# Patient Record
Sex: MALE | Race: WHITE | NOT HISPANIC OR LATINO | Employment: FULL TIME | URBAN - METROPOLITAN AREA
[De-identification: names, ages, dates, MRNs, and addresses within clinical notes are randomized per-mention and may not be internally consistent; named-entity substitution may affect disease eponyms.]

---

## 2017-05-15 ENCOUNTER — HOSPITAL ENCOUNTER (OUTPATIENT)
Dept: CT IMAGING | Facility: HOSPITAL | Age: 67
Discharge: HOME | End: 2017-05-15
Attending: INTERNAL MEDICINE | Admitting: INTERNAL MEDICINE

## 2017-05-15 ENCOUNTER — TRANSCRIBE ORDERS (OUTPATIENT)
Dept: ADMINISTRATIVE | Facility: HOSPITAL | Age: 67
End: 2017-05-15

## 2017-05-15 DIAGNOSIS — Z13.9 SCREENING: ICD-10-CM

## 2017-05-15 DIAGNOSIS — Z13.9 SCREENING: Primary | ICD-10-CM

## 2017-05-15 PROCEDURE — 75571 CT HRT W/O DYE W/CA TEST: CPT

## 2017-05-31 ENCOUNTER — TRANSCRIBE ORDERS (OUTPATIENT)
Dept: ADMINISTRATIVE | Facility: HOSPITAL | Age: 67
End: 2017-05-31

## 2017-05-31 DIAGNOSIS — Z13.9 SCREENING: Primary | ICD-10-CM

## 2017-06-22 ENCOUNTER — HOSPITAL ENCOUNTER (OUTPATIENT)
Dept: CARDIOLOGY | Facility: HOSPITAL | Age: 67
Discharge: HOME | End: 2017-06-22
Attending: INTERNAL MEDICINE | Admitting: INTERNAL MEDICINE

## 2017-06-22 VITALS
BODY MASS INDEX: 31.25 KG/M2 | DIASTOLIC BLOOD PRESSURE: 94 MMHG | WEIGHT: 211 LBS | HEART RATE: 82 BPM | HEIGHT: 69 IN | SYSTOLIC BLOOD PRESSURE: 149 MMHG

## 2017-06-22 DIAGNOSIS — Z13.9 SCREENING: ICD-10-CM

## 2017-06-22 LAB
BH CV ECHO MEAS - DIST AO DIAM: 1.48 CM
BH CV VAS BP LEFT ARM: NORMAL MMHG
BH CV VAS BP RIGHT ARM: NORMAL MMHG
BH CV XLRA MEAS - MID AO DIAM: 1.67 CM
BH CV XLRA MEAS - PAD LEFT ABI DP: 1.07
BH CV XLRA MEAS - PAD LEFT ABI PT: 1.12
BH CV XLRA MEAS - PAD LEFT ARM: 149 MMHG
BH CV XLRA MEAS - PAD LEFT LEG DP: 160 MMHG
BH CV XLRA MEAS - PAD LEFT LEG PT: 168 MMHG
BH CV XLRA MEAS - PAD RIGHT ABI DP: 1.07
BH CV XLRA MEAS - PAD RIGHT ABI PT: 1.1
BH CV XLRA MEAS - PAD RIGHT ARM: 147 MMHG
BH CV XLRA MEAS - PAD RIGHT LEG DP: 160 MMHG
BH CV XLRA MEAS - PAD RIGHT LEG PT: 164 MMHG
BH CV XLRA MEAS - PROX AO DIAM: 1.9 CM
BH CV XLRA MEAS LEFT ICA/CCA RATIO: 0.6
BH CV XLRA MEAS LEFT MID CCA PSV: NORMAL CM/SEC
BH CV XLRA MEAS LEFT MID ICA PSV: NORMAL CM/SEC
BH CV XLRA MEAS LEFT PROX ECA PSV: NORMAL CM/SEC
BH CV XLRA MEAS RIGHT ICA/CCA RATIO: 0.67
BH CV XLRA MEAS RIGHT MID CCA PSV: NORMAL CM/SEC
BH CV XLRA MEAS RIGHT MID ICA PSV: NORMAL CM/SEC
BH CV XLRA MEAS RIGHT PROX ECA PSV: NORMAL CM/SEC

## 2017-06-22 PROCEDURE — 93799 UNLISTED CV SVC/PROCEDURE: CPT

## 2018-05-07 ENCOUNTER — TRANSCRIBE ORDERS (OUTPATIENT)
Dept: ADMINISTRATIVE | Facility: HOSPITAL | Age: 68
End: 2018-05-07

## 2018-05-07 DIAGNOSIS — M54.2 NECK PAIN: Primary | ICD-10-CM

## 2018-05-15 ENCOUNTER — HOSPITAL ENCOUNTER (OUTPATIENT)
Dept: MRI IMAGING | Facility: HOSPITAL | Age: 68
Discharge: HOME | End: 2018-05-15
Attending: INTERNAL MEDICINE | Admitting: INTERNAL MEDICINE

## 2018-05-15 DIAGNOSIS — M54.2 NECK PAIN: ICD-10-CM

## 2018-05-15 PROCEDURE — 72141 MRI NECK SPINE W/O DYE: CPT

## 2018-05-29 RX ORDER — ROSUVASTATIN CALCIUM 5 MG/1
5 TABLET, COATED ORAL
COMMUNITY

## 2018-05-29 RX ORDER — ASPIRIN 81 MG/1
81 TABLET, CHEWABLE ORAL DAILY
COMMUNITY

## 2018-05-29 RX ORDER — MONTELUKAST SODIUM 10 MG/1
10 TABLET ORAL
COMMUNITY

## 2019-05-15 ENCOUNTER — TRANSCRIBE ORDERS (OUTPATIENT)
Dept: ADMINISTRATIVE | Facility: HOSPITAL | Age: 69
End: 2019-05-15

## 2019-05-15 DIAGNOSIS — Z13.9 VISIT FOR SCREENING: Primary | ICD-10-CM

## 2019-05-23 ENCOUNTER — HOSPITAL ENCOUNTER (OUTPATIENT)
Dept: CT IMAGING | Facility: HOSPITAL | Age: 69
Discharge: HOME | End: 2019-05-23
Admitting: INTERNAL MEDICINE

## 2019-05-23 DIAGNOSIS — Z13.9 VISIT FOR SCREENING: ICD-10-CM

## 2019-05-23 PROCEDURE — 75571 CT HRT W/O DYE W/CA TEST: CPT

## 2021-01-09 ENCOUNTER — IMMUNIZATION (OUTPATIENT)
Dept: VACCINE CLINIC | Facility: HOSPITAL | Age: 71
End: 2021-01-09

## 2021-01-09 PROCEDURE — 91300 HC SARSCOV02 VAC 30MCG/0.3ML IM: CPT | Performed by: INTERNAL MEDICINE

## 2021-01-09 PROCEDURE — 0001A: CPT | Performed by: INTERNAL MEDICINE

## 2021-01-21 ENCOUNTER — TRANSCRIBE ORDERS (OUTPATIENT)
Dept: ADMINISTRATIVE | Facility: HOSPITAL | Age: 71
End: 2021-01-21

## 2021-01-21 DIAGNOSIS — R07.9 CHEST PAIN, UNSPECIFIED TYPE: Primary | ICD-10-CM

## 2021-01-21 DIAGNOSIS — I72.6 VERTEBRAL ARTERY ANEURYSM (HCC): Primary | ICD-10-CM

## 2021-01-27 ENCOUNTER — APPOINTMENT (OUTPATIENT)
Dept: CT IMAGING | Facility: HOSPITAL | Age: 71
End: 2021-01-27

## 2021-01-27 ENCOUNTER — HOSPITAL ENCOUNTER (OUTPATIENT)
Dept: CT IMAGING | Facility: HOSPITAL | Age: 71
Discharge: HOME | End: 2021-01-27
Admitting: INTERNAL MEDICINE

## 2021-01-27 DIAGNOSIS — R07.9 CHEST PAIN, UNSPECIFIED TYPE: ICD-10-CM

## 2021-01-27 DIAGNOSIS — I72.6 VERTEBRAL ARTERY ANEURYSM (HCC): ICD-10-CM

## 2021-01-27 PROCEDURE — 74176 CT ABD & PELVIS W/O CONTRAST: CPT

## 2021-01-27 PROCEDURE — 71250 CT THORAX DX C-: CPT

## 2021-01-29 ENCOUNTER — APPOINTMENT (OUTPATIENT)
Dept: VACCINE CLINIC | Facility: HOSPITAL | Age: 71
End: 2021-01-29

## 2021-01-30 ENCOUNTER — IMMUNIZATION (OUTPATIENT)
Dept: VACCINE CLINIC | Facility: HOSPITAL | Age: 71
End: 2021-01-30

## 2021-01-30 PROCEDURE — 91300 HC SARSCOV02 VAC 30MCG/0.3ML IM: CPT | Performed by: INTERNAL MEDICINE

## 2021-01-30 PROCEDURE — 0002A: CPT | Performed by: INTERNAL MEDICINE

## 2021-02-09 ENCOUNTER — APPOINTMENT (OUTPATIENT)
Dept: CT IMAGING | Facility: HOSPITAL | Age: 71
End: 2021-02-09

## 2021-09-21 ENCOUNTER — APPOINTMENT (OUTPATIENT)
Dept: VACCINE CLINIC | Facility: HOSPITAL | Age: 71
End: 2021-09-21

## 2023-02-07 ENCOUNTER — TRANSCRIBE ORDERS (OUTPATIENT)
Dept: ADMINISTRATIVE | Facility: HOSPITAL | Age: 73
End: 2023-02-07
Payer: MEDICARE

## 2023-02-07 DIAGNOSIS — E04.1 THYROID NODULE: Primary | ICD-10-CM

## 2023-02-07 DIAGNOSIS — I70.0 ATHEROSCLEROSIS OF AORTA: ICD-10-CM

## 2023-02-07 DIAGNOSIS — I72.8 ANEURYSM OF SPLENIC ARTERY: ICD-10-CM

## 2023-03-07 ENCOUNTER — HOSPITAL ENCOUNTER (OUTPATIENT)
Dept: CT IMAGING | Facility: HOSPITAL | Age: 73
Discharge: HOME | End: 2023-03-07
Payer: MEDICARE

## 2023-03-07 ENCOUNTER — HOSPITAL ENCOUNTER (OUTPATIENT)
Dept: ULTRASOUND IMAGING | Facility: HOSPITAL | Age: 73
Discharge: HOME | End: 2023-03-07
Payer: MEDICARE

## 2023-03-07 DIAGNOSIS — E04.1 THYROID NODULE: ICD-10-CM

## 2023-03-07 DIAGNOSIS — I72.8 ANEURYSM OF SPLENIC ARTERY: ICD-10-CM

## 2023-03-07 DIAGNOSIS — I70.0 ATHEROSCLEROSIS OF AORTA: ICD-10-CM

## 2023-03-07 PROCEDURE — 76536 US EXAM OF HEAD AND NECK: CPT

## 2023-03-07 PROCEDURE — 74176 CT ABD & PELVIS W/O CONTRAST: CPT

## 2023-03-07 PROCEDURE — 71250 CT THORAX DX C-: CPT

## 2023-03-08 ENCOUNTER — TRANSCRIBE ORDERS (OUTPATIENT)
Dept: ADMINISTRATIVE | Facility: HOSPITAL | Age: 73
End: 2023-03-08
Payer: MEDICARE

## 2023-03-08 DIAGNOSIS — K22.2 STRICTURE OF ESOPHAGUS: Primary | ICD-10-CM

## 2023-09-07 ENCOUNTER — TRANSCRIBE ORDERS (OUTPATIENT)
Dept: ADMINISTRATIVE | Facility: HOSPITAL | Age: 73
End: 2023-09-07
Payer: MEDICARE

## 2023-09-07 DIAGNOSIS — Z13.6 ENCOUNTER FOR SCREENING FOR VASCULAR DISEASE: Primary | ICD-10-CM

## 2023-10-30 ENCOUNTER — TELEPHONE (OUTPATIENT)
Dept: SURGERY | Facility: CLINIC | Age: 73
End: 2023-10-30
Payer: MEDICARE

## 2023-10-30 ENCOUNTER — OFFICE VISIT (OUTPATIENT)
Dept: SURGERY | Facility: CLINIC | Age: 73
End: 2023-10-30
Payer: MEDICARE

## 2023-10-30 VITALS
HEART RATE: 85 BPM | HEIGHT: 70 IN | OXYGEN SATURATION: 99 % | SYSTOLIC BLOOD PRESSURE: 168 MMHG | BODY MASS INDEX: 31.54 KG/M2 | DIASTOLIC BLOOD PRESSURE: 83 MMHG | WEIGHT: 220.3 LBS

## 2023-10-30 DIAGNOSIS — K64.4 EXTERNAL HEMORRHOIDS: Primary | ICD-10-CM

## 2023-10-30 PROBLEM — N52.9 ERECTILE DYSFUNCTION: Status: ACTIVE | Noted: 2023-09-05

## 2023-10-30 PROBLEM — R93.89 ABNORMAL COMPUTERIZED AXIAL TOMOGRAPHY OF CHEST: Status: ACTIVE | Noted: 2023-09-05

## 2023-10-30 PROBLEM — L73.9 CHRONIC FOLLICULITIS: Status: ACTIVE | Noted: 2020-07-08

## 2023-10-30 PROBLEM — E66.3 OVERWEIGHT: Status: ACTIVE | Noted: 2019-11-20

## 2023-10-30 PROBLEM — Z98.42 CATARACT EXTRACTION STATUS OF LEFT EYE: Status: ACTIVE | Noted: 2021-07-08

## 2023-10-30 PROBLEM — L91.8 SKIN TAGS, MULTIPLE ACQUIRED: Status: ACTIVE | Noted: 2023-02-15

## 2023-10-30 PROBLEM — L21.9 SEBORRHEIC DERMATITIS: Status: ACTIVE | Noted: 2022-08-18

## 2023-10-30 PROBLEM — M84.361A STRESS FRACTURE OF RIGHT TIBIA: Status: ACTIVE | Noted: 2019-07-10

## 2023-10-30 PROBLEM — J98.11 CHRONIC ATELECTASIS: Status: ACTIVE | Noted: 2023-03-09

## 2023-10-30 PROBLEM — K22.9 DISORDER OF ESOPHAGUS: Status: ACTIVE | Noted: 2023-03-09

## 2023-10-30 PROBLEM — L08.9 INFECTED SEBACEOUS CYST: Status: ACTIVE | Noted: 2023-08-02

## 2023-10-30 PROBLEM — I72.8 ANEURYSM OF SPLENIC ARTERY: Status: ACTIVE | Noted: 2019-07-10

## 2023-10-30 PROBLEM — Z98.41 CATARACT EXTRACTION STATUS OF RIGHT EYE: Status: ACTIVE | Noted: 2021-07-08

## 2023-10-30 PROBLEM — J36 PERITONSILLAR ABSCESS: Status: ACTIVE | Noted: 2020-07-08

## 2023-10-30 PROBLEM — Z80.0 FAMILY HISTORY OF MALIGNANT NEOPLASM OF COLON: Status: ACTIVE | Noted: 2017-05-25

## 2023-10-30 PROBLEM — H35.369 MACULAR DRUSEN: Status: ACTIVE | Noted: 2021-07-08

## 2023-10-30 PROBLEM — K40.90 RIGHT INGUINAL HERNIA: Status: ACTIVE | Noted: 2018-06-04

## 2023-10-30 PROBLEM — E83.52 HYPERCALCEMIA: Status: ACTIVE | Noted: 2019-11-20

## 2023-10-30 PROBLEM — H61.20 IMPACTED CERUMEN: Status: ACTIVE | Noted: 2019-07-10

## 2023-10-30 PROBLEM — L72.3 INFECTED SEBACEOUS CYST: Status: ACTIVE | Noted: 2023-08-02

## 2023-10-30 PROBLEM — R73.01 IMPAIRED FASTING GLUCOSE: Status: ACTIVE | Noted: 2023-02-07

## 2023-10-30 PROBLEM — I10 ESSENTIAL HYPERTENSION: Status: ACTIVE | Noted: 2017-01-30

## 2023-10-30 PROBLEM — E04.2 MULTINODULAR GOITER: Status: ACTIVE | Noted: 2023-09-05

## 2023-10-30 PROBLEM — G54.2 CERVICAL SYNDROME: Status: ACTIVE | Noted: 2018-02-19

## 2023-10-30 PROBLEM — H43.819 VITREOUS DETACHMENT: Status: ACTIVE | Noted: 2019-07-10

## 2023-10-30 PROBLEM — G72.2 TOXIC MYOPATHY: Status: ACTIVE | Noted: 2017-01-30

## 2023-10-30 PROBLEM — J45.909 ALLERGIC ASTHMA: Status: ACTIVE | Noted: 2019-04-22

## 2023-10-30 PROBLEM — H33.311 RETINAL TEAR OF RIGHT EYE: Status: ACTIVE | Noted: 2021-07-08

## 2023-10-30 PROBLEM — Q61.02 MULTIPLE RENAL CYSTS: Status: ACTIVE | Noted: 2023-03-09

## 2023-10-30 PROBLEM — J20.9 ACUTE BRONCHITIS: Status: ACTIVE | Noted: 2023-04-17

## 2023-10-30 PROCEDURE — 3077F SYST BP >= 140 MM HG: CPT | Performed by: PHYSICIAN ASSISTANT

## 2023-10-30 PROCEDURE — 3079F DIAST BP 80-89 MM HG: CPT | Performed by: PHYSICIAN ASSISTANT

## 2023-10-30 PROCEDURE — 99204 OFFICE O/P NEW MOD 45 MIN: CPT | Performed by: PHYSICIAN ASSISTANT

## 2023-10-30 PROCEDURE — 46600 DIAGNOSTIC ANOSCOPY SPX: CPT | Performed by: PHYSICIAN ASSISTANT

## 2023-10-30 PROCEDURE — 1159F MED LIST DOCD IN RCRD: CPT | Performed by: PHYSICIAN ASSISTANT

## 2023-10-30 PROCEDURE — 1160F RVW MEDS BY RX/DR IN RCRD: CPT | Performed by: PHYSICIAN ASSISTANT

## 2023-10-30 RX ORDER — MULTIPLE VITAMINS W/ MINERALS TAB 9MG-400MCG
1 TAB ORAL DAILY
COMMUNITY
Start: 2016-05-24

## 2023-10-30 RX ORDER — SILDENAFIL 100 MG/1
100 TABLET, FILM COATED ORAL AS NEEDED
COMMUNITY
Start: 2023-10-26

## 2023-10-30 RX ORDER — CETIRIZINE HYDROCHLORIDE 10 MG/1
CAPSULE, LIQUID FILLED ORAL EVERY 24 HOURS
COMMUNITY

## 2023-10-30 RX ORDER — MINOCYCLINE HYDROCHLORIDE 100 MG/1
100 CAPSULE ORAL 2 TIMES DAILY
COMMUNITY
End: 2023-10-30

## 2023-10-30 RX ORDER — PROPRANOLOL HYDROCHLORIDE 20 MG/1
20 TABLET ORAL 2 TIMES DAILY
COMMUNITY
End: 2023-10-30

## 2023-10-30 RX ORDER — ASCORBIC ACID 1000 MG
TABLET ORAL
COMMUNITY
Start: 2014-04-08 | End: 2023-10-30

## 2023-10-30 RX ORDER — EVOLOCUMAB 140 MG/ML
1 INJECTION, SOLUTION SUBCUTANEOUS
COMMUNITY
End: 2023-10-30

## 2023-10-30 RX ORDER — ERGOCALCIFEROL 1.25 MG/1
50000 CAPSULE ORAL
COMMUNITY

## 2023-10-30 RX ORDER — ROSUVASTATIN CALCIUM 10 MG/1
10 TABLET, COATED ORAL DAILY
COMMUNITY

## 2023-10-30 RX ORDER — METOPROLOL SUCCINATE 25 MG/1
25 TABLET, EXTENDED RELEASE ORAL DAILY
COMMUNITY

## 2023-10-30 RX ORDER — HYDROCORTISONE 25 MG/G
CREAM TOPICAL
Qty: 30 G | Refills: 1 | Status: SHIPPED | OUTPATIENT
Start: 2023-10-30 | End: 2023-10-30 | Stop reason: SDUPTHER

## 2023-10-30 RX ORDER — TRIAMCINOLONE ACETONIDE 0.25 MG/G
1 CREAM TOPICAL 2 TIMES DAILY
COMMUNITY
End: 2023-10-30

## 2023-10-30 RX ORDER — HYDROCORTISONE 25 MG/G
CREAM TOPICAL
Qty: 30 G | Refills: 1 | Status: SHIPPED | OUTPATIENT
Start: 2023-10-30

## 2023-10-30 RX ORDER — EZETIMIBE 10 MG/1
10 TABLET ORAL DAILY
COMMUNITY
End: 2023-10-30

## 2023-10-30 NOTE — TELEPHONE ENCOUNTER
Pt was calling because he did not see med sent to pharmacy. Rx was sent to [facility] and should have been sent to [facility]. Pharmacy has been updated to correct one.

## 2023-10-30 NOTE — PROGRESS NOTES
Shankar Vides is a 73 y.o. male who is seen as a consult at the request of Self Referring for Hemorrhoids.      HPI:    Pt presents today for evaluation of intermittent perianal burning sensation.  Started a few years ago.  Typically starts before/during defecation.   Does not use any creams or suppositories.     Has approximately 2-3 BMs daily.   Stool was loose ([place]: 6), but since starting Florastor, has become more formed ([place]: 2).   No constipation.   Not taking any fiber or laxatives.   No straining.   No RB or mucus discharge.    Pt states he had a thrombosed external hemorrhoid approximately 20 years ago.   Denies undergoing thrombectomy, and states area healed on it's own with time, but left a skin tag in the area.   Denies any anorectal surgical Hx.     Experienced occasional fecal leakage since childhood.   Recently started using bidet, and has not had this issue since.     Taking ASA 81 mg daily.   Denies taking any anticoagulation.     Last colonoscopy approximately 9 years ago.   Tests with Cologuard every 3 years since.   Next colonoscopy due next year.  Reports diverticulosis, but denies any personal Hx of colon polyps.     Paternal uncle with Hx of colon cancer.   No FHx of colon polyps or colon cancer in any first degree relatives.   Father with Hx of UC Dx in his 60's.     Past Medical History:   Diagnosis Date    Hyperlipidemia     PVC (premature ventricular contraction)        Past Surgical History:   Procedure Laterality Date    EYE SURGERY Bilateral     cataract    INGUINAL HERNIA REPAIR Right        Social History:   reports that he has never smoked. He has never been exposed to tobacco smoke. He has never used smokeless tobacco. He reports that he does not drink alcohol and does not use drugs.      Marriage status:     Family History   Problem Relation Age of Onset    Heart disease Mother     Cancer Father          Current Outpatient Medications:     aspirin 81 MG chewable  tablet, Chew 1 tablet Daily., Disp: , Rfl:     Cetirizine HCl (ZyrTEC Allergy) 10 MG capsule, Daily., Disp: , Rfl:     Magnesium 100 MG capsule, magnesium, Disp: , Rfl:     metoprolol succinate XL (TOPROL-XL) 25 MG 24 hr tablet, Take 1 tablet by mouth Daily., Disp: , Rfl:     multivitamin with minerals (MULTIVITAMIN ADULTS PO), Take 1 tablet by mouth Daily., Disp: , Rfl:     rosuvastatin (CRESTOR) 10 MG tablet, Take 1 tablet by mouth Daily., Disp: , Rfl:     rosuvastatin (CRESTOR) 5 MG tablet, Take 1 tablet by mouth., Disp: , Rfl:     sildenafil (VIAGRA) 100 MG tablet, Take 1 tablet by mouth As Needed., Disp: , Rfl:     vitamin D (ERGOCALCIFEROL) 1.25 MG (04328 UT) capsule capsule, Take 1 capsule by mouth Every 7 (Seven) Days., Disp: , Rfl:     Hydrocortisone, Perianal, (Anusol-HC) 2.5 % rectal cream, Apply rectally 3 times daily.  Include applicator., Disp: 30 g, Rfl: 1    Allergy  Patient has no known allergies.    Review of Systems   Constitutional: Negative for decreased appetite and weight gain.   HENT:  Negative for congestion, hearing loss and hoarse voice.    Eyes:  Negative for blurred vision, discharge and visual disturbance.   Cardiovascular:  Negative for chest pain, cyanosis and leg swelling.   Respiratory:  Negative for cough, shortness of breath, sleep disturbances due to breathing and snoring.    Endocrine: Negative for cold intolerance and heat intolerance.   Hematologic/Lymphatic: Does not bruise/bleed easily.   Skin:  Negative for itching, poor wound healing and skin cancer.   Musculoskeletal:  Positive for neck pain. Negative for arthritis, back pain, joint pain and joint swelling.   Gastrointestinal:  Negative for abdominal pain, change in bowel habit, bowel incontinence and constipation.   Genitourinary:  Negative for bladder incontinence, dysuria and hematuria.   Neurological:  Negative for brief paralysis, excessive daytime sleepiness, dizziness, focal weakness, headaches, light-headedness  and weakness.   Psychiatric/Behavioral:  Negative for altered mental status and hallucinations. The patient does not have insomnia.    Allergic/Immunologic: Negative for HIV exposure and persistent infections.   All other systems reviewed and are negative.      Vitals:    10/30/23 1014   BP: 168/83   Pulse: 85   SpO2: 99%     Body mass index is 31.61 kg/m².    Physical Exam  Exam conducted with a chaperone present.   Constitutional:       General: He is not in acute distress.     Appearance: He is well-developed.   HENT:      Head: Normocephalic and atraumatic.      Nose: Nose normal.   Eyes:      Conjunctiva/sclera: Conjunctivae normal.      Pupils: Pupils are equal, round, and reactive to light.   Neck:      Trachea: No tracheal deviation.   Pulmonary:      Effort: Pulmonary effort is normal. No respiratory distress.      Breath sounds: Normal breath sounds.   Abdominal:      General: Bowel sounds are normal. There is no distension.      Palpations: Abdomen is soft.   Genitourinary:     Comments: Perianal exam: Anal skin tag anteriorly. Small divot anteriorly with mild erythema. Mild enlargement of the left lateral and right posterior hemorrhoids. Multiple small sebaceous cyst circumferentially.   MARTA- Good tone, no masses  Anoscopy performed: Internal hemorrhoids WNL.   Musculoskeletal:         General: No deformity. Normal range of motion.      Cervical back: Normal range of motion.   Skin:     General: Skin is warm and dry.   Neurological:      Mental Status: He is alert and oriented to person, place, and time.      Cranial Nerves: No cranial nerve deficit.      Coordination: Coordination normal.      Gait: Gait normal.   Psychiatric:         Behavior: Behavior normal.         Judgment: Judgment normal.       Review of Medical Record:  I reviewed medical records as detailed in HPI.     Assessment:  1. External hemorrhoids    - New    Plan:  - Discussed physical exam findings with Pt.    - It is possible that  stool is pocketing within the small divot anteriorly causing skin irritation/burning. Pt using a bidet with enhanced cleaning of the perianal skin. Also discussed foods that could cause perianal skin irritation such as caffeine, spicy foods, dairy, tomato based products, etc. Can use a barrier cream PRN to prevent/reduced irritation.   - Rx for Anusol-HC 2.5% rectal cream provided for treatment of the hemorrhoids. Apply Internally and Externally TID x7-10 Days. Cautioned against chronic use.   - Follow up in 8 weeks for reevaluation or sooner for any new or worsening symptoms.

## 2023-11-03 ENCOUNTER — PATIENT ROUNDING (BHMG ONLY) (OUTPATIENT)
Dept: SURGERY | Facility: CLINIC | Age: 73
End: 2023-11-03
Payer: MEDICARE

## 2023-11-03 NOTE — PROGRESS NOTES
November 3, 2023    Hello, may I speak with Shankar Vides?    My name is Terence      I am  with [facility]  [address]  [facility] [id number] [phone].    Before we get started may I verify your date of birth? 1950    I am calling to officially welcome you to our practice and ask about your recent visit. Is this a good time to talk? yes    Tell me about your visit with us. What things went well?  Everything went well.        We're always looking for ways to make our patients' experiences even better. Do you have recommendations on ways we may improve?  no    Overall were you satisfied with your first visit to our practice? yes       I appreciate you taking the time to speak with me today. Is there anything else I can do for you? no      Thank you, and have a great day.

## 2023-12-01 ENCOUNTER — HOSPITAL ENCOUNTER (OUTPATIENT)
Dept: CT IMAGING | Facility: HOSPITAL | Age: 73
Discharge: HOME | End: 2023-12-01
Admitting: INTERNAL MEDICINE

## 2023-12-01 DIAGNOSIS — Z13.6 ENCOUNTER FOR SCREENING FOR VASCULAR DISEASE: ICD-10-CM

## 2023-12-01 PROCEDURE — 75571 CT HRT W/O DYE W/CA TEST: CPT

## 2024-02-14 ENCOUNTER — TRANSCRIBE ORDERS (OUTPATIENT)
Dept: ADMINISTRATIVE | Facility: HOSPITAL | Age: 74
End: 2024-02-14
Payer: MEDICARE

## 2024-02-14 DIAGNOSIS — R91.1 NODULE OF LOWER LOBE OF LEFT LUNG: Primary | ICD-10-CM

## 2024-03-06 ENCOUNTER — HOSPITAL ENCOUNTER (OUTPATIENT)
Dept: CT IMAGING | Facility: HOSPITAL | Age: 74
Discharge: HOME | End: 2024-03-06
Admitting: INTERNAL MEDICINE
Payer: MEDICARE

## 2024-03-06 DIAGNOSIS — R91.1 NODULE OF LOWER LOBE OF LEFT LUNG: ICD-10-CM

## 2024-03-06 PROCEDURE — 71250 CT THORAX DX C-: CPT

## 2024-09-19 ENCOUNTER — TRANSCRIBE ORDERS (OUTPATIENT)
Dept: ADMINISTRATIVE | Facility: HOSPITAL | Age: 74
End: 2024-09-19
Payer: MEDICARE

## 2024-09-19 DIAGNOSIS — I25.10 CORONARY ARTERY SCLEROSIS: Primary | ICD-10-CM

## 2024-10-10 ENCOUNTER — HOSPITAL ENCOUNTER (OUTPATIENT)
Dept: CARDIOLOGY | Facility: HOSPITAL | Age: 74
Discharge: HOME | End: 2024-10-10
Admitting: INTERNAL MEDICINE
Payer: MEDICARE

## 2024-10-10 VITALS
DIASTOLIC BLOOD PRESSURE: 109 MMHG | WEIGHT: 218.26 LBS | HEIGHT: 70 IN | HEART RATE: 92 BPM | BODY MASS INDEX: 31.25 KG/M2 | SYSTOLIC BLOOD PRESSURE: 184 MMHG

## 2024-10-10 DIAGNOSIS — I25.10 CORONARY ARTERY SCLEROSIS: ICD-10-CM

## 2024-10-10 LAB
AORTIC DIMENSIONLESS INDEX: 0.9 (DI)
ASCENDING AORTA: 3.1 CM
BH CV ECHO MEAS - ACS: 1.82 CM
BH CV ECHO MEAS - AO MAX PG: 5.8 MMHG
BH CV ECHO MEAS - AO MEAN PG: 2.7 MMHG
BH CV ECHO MEAS - AO ROOT DIAM: 3.7 CM
BH CV ECHO MEAS - AO V2 MAX: 120.8 CM/SEC
BH CV ECHO MEAS - AO V2 VTI: 21.3 CM
BH CV ECHO MEAS - AVA(I,D): 2.8 CM2
BH CV ECHO MEAS - CONTRAST EF (2CH): 60 CM2
BH CV ECHO MEAS - CONTRAST EF 4CH: 60 CM2
BH CV ECHO MEAS - EDV(CUBED): 127.2 ML
BH CV ECHO MEAS - EDV(MOD-SP2): 70 ML
BH CV ECHO MEAS - EDV(MOD-SP4): 118 ML
BH CV ECHO MEAS - EF(MOD-BP): 60 %
BH CV ECHO MEAS - ESV(CUBED): 31.6 ML
BH CV ECHO MEAS - ESV(MOD-SP2): 23 ML
BH CV ECHO MEAS - ESV(MOD-SP4): 21 ML
BH CV ECHO MEAS - FS: 37.1 %
BH CV ECHO MEAS - IVS/LVPW: 0.95 CM
BH CV ECHO MEAS - IVSD: 0.97 CM
BH CV ECHO MEAS - LAT PEAK E' VEL: 8.4 CM/SEC
BH CV ECHO MEAS - LV DIASTOLIC VOL/BSA (35-75): 54.6 CM2
BH CV ECHO MEAS - LV MASS(C)D: 183.3 GRAMS
BH CV ECHO MEAS - LV MAX PG: 5 MMHG
BH CV ECHO MEAS - LV MEAN PG: 2.01 MMHG
BH CV ECHO MEAS - LV SYSTOLIC VOL/BSA (12-30): 9.7 CM2
BH CV ECHO MEAS - LV V1 MAX: 111.6 CM/SEC
BH CV ECHO MEAS - LV V1 VTI: 19.7 CM
BH CV ECHO MEAS - LVIDD: 5 CM
BH CV ECHO MEAS - LVIDS: 3.2 CM
BH CV ECHO MEAS - LVOT AREA: 3.1 CM2
BH CV ECHO MEAS - LVOT DIAM: 1.98 CM
BH CV ECHO MEAS - LVPWD: 1.02 CM
BH CV ECHO MEAS - MED PEAK E' VEL: 7.3 CM/SEC
BH CV ECHO MEAS - MR MAX PG: 25.2 MMHG
BH CV ECHO MEAS - MR MAX VEL: 251.1 CM/SEC
BH CV ECHO MEAS - MV A DUR: 0.11 SEC
BH CV ECHO MEAS - MV A MAX VEL: 74.1 CM/SEC
BH CV ECHO MEAS - MV DEC SLOPE: 470.6 CM/SEC2
BH CV ECHO MEAS - MV DEC TIME: 0.24 SEC
BH CV ECHO MEAS - MV E MAX VEL: 58.7 CM/SEC
BH CV ECHO MEAS - MV E/A: 0.79
BH CV ECHO MEAS - MV MAX PG: 4.7 MMHG
BH CV ECHO MEAS - MV MEAN PG: 1.86 MMHG
BH CV ECHO MEAS - MV P1/2T: 52.4 MSEC
BH CV ECHO MEAS - MV V2 VTI: 22.2 CM
BH CV ECHO MEAS - MVA(P1/2T): 4.2 CM2
BH CV ECHO MEAS - MVA(VTI): 2.7 CM2
BH CV ECHO MEAS - PA ACC TIME: 0.12 SEC
BH CV ECHO MEAS - PA V2 MAX: 108.6 CM/SEC
BH CV ECHO MEAS - PULM A REVS DUR: 0.15 SEC
BH CV ECHO MEAS - PULM A REVS VEL: 39.8 CM/SEC
BH CV ECHO MEAS - PULM DIAS VEL: 31.7 CM/SEC
BH CV ECHO MEAS - PULM S/D: 1.2
BH CV ECHO MEAS - PULM SYS VEL: 38.1 CM/SEC
BH CV ECHO MEAS - QP/QS: 0.77
BH CV ECHO MEAS - RV MAX PG: 2.8 MMHG
BH CV ECHO MEAS - RV V1 MAX: 84 CM/SEC
BH CV ECHO MEAS - RV V1 VTI: 15.4 CM
BH CV ECHO MEAS - RVOT DIAM: 1.97 CM
BH CV ECHO MEAS - SV(LVOT): 60.5 ML
BH CV ECHO MEAS - SV(MOD-SP2): 47 ML
BH CV ECHO MEAS - SV(MOD-SP4): 97 ML
BH CV ECHO MEAS - SV(RVOT): 46.7 ML
BH CV ECHO MEAS - SVI(LVOT): 28 ML/M2
BH CV ECHO MEAS - SVI(MOD-SP2): 21.8 ML/M2
BH CV ECHO MEAS - SVI(MOD-SP4): 44.9 ML/M2
BH CV ECHO MEASUREMENTS AVERAGE E/E' RATIO: 7.48
BH CV STRESS BP STAGE 1: NORMAL
BH CV STRESS BP STAGE 2: NORMAL
BH CV STRESS DURATION MIN STAGE 1: 3
BH CV STRESS DURATION MIN STAGE 2: 1
BH CV STRESS DURATION SEC STAGE 1: 0
BH CV STRESS DURATION SEC STAGE 2: 40
BH CV STRESS ECHO POST STRESS EJECTION FRACTION EF: 74 %
BH CV STRESS GRADE STAGE 1: 10
BH CV STRESS GRADE STAGE 2: 12
BH CV STRESS HR STAGE 1: 124
BH CV STRESS HR STAGE 2: 138
BH CV STRESS METS STAGE 1: 5
BH CV STRESS METS STAGE 2: 7.5
BH CV STRESS PROTOCOL 1: NORMAL
BH CV STRESS RECOVERY BP: NORMAL MMHG
BH CV STRESS RECOVERY HR: 96 BPM
BH CV STRESS SPEED STAGE 1: 1.7
BH CV STRESS SPEED STAGE 2: 2.5
BH CV STRESS STAGE 1: 1
BH CV STRESS STAGE 2: 2
BH CV XLRA - RV BASE: 3 CM
BH CV XLRA - RV LENGTH: 7.7 CM
BH CV XLRA - RV MID: 2.8 CM
BH CV XLRA - TDI S': 19.3 CM/SEC
LEFT ATRIUM VOLUME INDEX: 16.7 ML/M2
MAXIMAL PREDICTED HEART RATE: 148 BPM
PERCENT MAX PREDICTED HR: 95.27 %
SINUS: 3.1 CM
STJ: 2.49 CM
STRESS BASELINE BP: NORMAL MMHG
STRESS BASELINE HR: 94 BPM
STRESS PERCENT HR: 112 %
STRESS POST ESTIMATED WORKLOAD: 6.6 METS
STRESS POST EXERCISE DUR MIN: 4 MIN
STRESS POST EXERCISE DUR SEC: 40 SEC
STRESS POST PEAK BP: NORMAL MMHG
STRESS POST PEAK HR: 141 BPM
STRESS TARGET HR: 126 BPM

## 2024-10-10 PROCEDURE — 93017 CV STRESS TEST TRACING ONLY: CPT

## 2024-10-10 PROCEDURE — 25510000001 PERFLUTREN 6.52 MG/ML SUSPENSION 2 ML VIAL: Performed by: INTERNAL MEDICINE

## 2024-10-10 PROCEDURE — 93325 DOPPLER ECHO COLOR FLOW MAPG: CPT

## 2024-10-10 PROCEDURE — 93320 DOPPLER ECHO COMPLETE: CPT

## 2024-10-10 PROCEDURE — 93350 STRESS TTE ONLY: CPT

## 2024-10-10 RX ADMIN — SODIUM CHLORIDE 2 ML: 9 INJECTION INTRAMUSCULAR; INTRAVENOUS; SUBCUTANEOUS at 10:54

## 2025-04-30 ENCOUNTER — TRANSCRIBE ORDERS (OUTPATIENT)
Dept: ADMINISTRATIVE | Facility: HOSPITAL | Age: 75
End: 2025-04-30
Payer: MEDICARE

## 2025-04-30 DIAGNOSIS — I72.8 ANEURYSM OF SPLENIC ARTERY: ICD-10-CM

## 2025-04-30 DIAGNOSIS — R91.1 PULMONARY NODULE: Primary | ICD-10-CM

## 2025-04-30 DIAGNOSIS — I72.8 ANEURYSM OF SPLENIC ARTERY: Primary | ICD-10-CM

## 2025-05-21 ENCOUNTER — HOSPITAL ENCOUNTER (OUTPATIENT)
Dept: CT IMAGING | Facility: HOSPITAL | Age: 75
Discharge: HOME | End: 2025-05-21
Admitting: INTERNAL MEDICINE
Payer: MEDICARE

## 2025-05-21 DIAGNOSIS — I72.8 ANEURYSM OF SPLENIC ARTERY: ICD-10-CM

## 2025-05-21 DIAGNOSIS — R91.1 PULMONARY NODULE: ICD-10-CM

## 2025-05-21 PROCEDURE — 71250 CT THORAX DX C-: CPT

## 2025-05-21 PROCEDURE — 74176 CT ABD & PELVIS W/O CONTRAST: CPT
